# Patient Record
Sex: FEMALE | Race: OTHER
[De-identification: names, ages, dates, MRNs, and addresses within clinical notes are randomized per-mention and may not be internally consistent; named-entity substitution may affect disease eponyms.]

---

## 2020-04-04 ENCOUNTER — HOSPITAL ENCOUNTER (EMERGENCY)
Dept: HOSPITAL 54 - ER | Age: 30
LOS: 1 days | Discharge: HOME | End: 2020-04-05
Payer: SELF-PAY

## 2020-04-04 VITALS — WEIGHT: 130 LBS | HEIGHT: 62 IN | BODY MASS INDEX: 23.92 KG/M2
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
## 2020-04-04 DIAGNOSIS — S01.112A: Primary | ICD-10-CM

## 2020-04-04 PROCEDURE — 70450 CT HEAD/BRAIN W/O DYE: CPT

## 2020-04-04 PROCEDURE — 72125 CT NECK SPINE W/O DYE: CPT

## 2020-04-04 PROCEDURE — 90715 TDAP VACCINE 7 YRS/> IM: CPT

## 2020-04-04 PROCEDURE — 90471 IMMUNIZATION ADMIN: CPT

## 2020-04-04 PROCEDURE — A6403 STERILE GAUZE>16 <= 48 SQ IN: HCPCS

## 2020-04-04 PROCEDURE — 12014 RPR F/E/E/N/L/M 5.1-7.5 CM: CPT

## 2020-04-04 PROCEDURE — 99285 EMERGENCY DEPT VISIT HI MDM: CPT

## 2020-04-04 PROCEDURE — 70486 CT MAXILLOFACIAL W/O DYE: CPT

## 2020-04-05 VITALS — DIASTOLIC BLOOD PRESSURE: 77 MMHG | SYSTOLIC BLOOD PRESSURE: 118 MMHG

## 2020-04-05 NOTE — NUR
PATIENT CAME TO ER BED 10 C/O LACERATION ON FOREHEAD. PATIENT STAES THAT SHE 
WAS ON HER WAY HOME FROM HER BROTHER'S HOUSE WHEN SHE HAD FALLEN ASLEEP BEHIND 
THE WHEEL. PATIENT STATES THAT SHE WOKE UP WITH A LACERATION ON HER FOREHEAD 
BETWEEN HER TWO EYEBROWS. PATIENT DENIES DRINKING ALCOHOL OR TAKING ANY DRUGS. 
AAOX4. NO SOB. BREATHING EVENLY AND UNLABORED.

-------------------------------------------------------------------------------

Addendum: 04/05/20 at 0012 by BLESSING

-------------------------------------------------------------------------------

CURRENTLY NO BLEEDING.